# Patient Record
(demographics unavailable — no encounter records)

---

## 2024-11-06 NOTE — REVIEW OF SYSTEMS
[Fever] : no fever [Chills] : no chills [Nasal Congestion] : nasal congestion [Postnasal Drip] : postnasal drip [Cough] : no cough [Sputum] : no sputum [Chest Discomfort] : no chest discomfort [GERD] : gerd [Anxiety] : anxiety

## 2024-11-06 NOTE — PHYSICAL EXAM
[No Acute Distress] : no acute distress [Normal Appearance] : normal appearance [Normal Rate/Rhythm] : normal rate/rhythm [Normal S1, S2] : normal s1, s2 [No Resp Distress] : no resp distress [Clear to Auscultation Bilaterally] : clear to auscultation bilaterally [Normal Gait] : normal gait [No Clubbing] : no clubbing [No Edema] : no edema [Oriented x3] : oriented x3 [TextBox_11] : erythema of bilateral nasal turbinates

## 2024-11-07 NOTE — ASSESSMENT
[FreeTextEntry1] : 59-year-old female, former smoker, multinodular goiter, anxiety and bipolar 2 with seasonal reactive airway disease here for a follow up visit.  Symbicort prn. UTD with flu and pneumococcal vaccination.

## 2024-11-07 NOTE — HISTORY OF PRESENT ILLNESS
[Former] : former [TextBox_4] : 59-year-old female, former smoker, multinodular goiter, anxiety and bipolar 2 with seasonal reactive airway disease here for a follow up visit.   PFT (11/22/2023) normal FEV1, FVC, FEV1/FVC, lung volumes and DLCO.   No significant change in symptoms.  [TextBox_11] : 1 - 2  [TextBox_13] : 15 [YearQuit] : 1991

## 2024-11-19 NOTE — HISTORY OF PRESENT ILLNESS
[FreeTextEntry1] :  Pt returns for a follow-up visit for thyroid nodule/MNG. No interval health changes. No major surgeries, hospitalizations, fractures or changes in medication.  H/o MNG dx in 2005. FNA 2005 does not remember which side, results nonmalignant. +FHx nodular goiter and hypothyroidism (mother and sister). No h/o RT. Was on lithium for about 10 years but d/c in distant past. No amiodarone use. No local neck pain. No dysphonia but pt c/o dysphagia, she has not had any f/u for this yet. No raciness, shakiness, heat/cold intolerance, tiredness, or fatigue. No palpitations, tremors, or sudden weight gain/loss.  6/18/2015: 0.0 x 0.7 x 1.cm right upper lobe nodule, partially cystic small area of ca rim. Smaller 3 mm right lower lobe nodule.  Previously hospitalized at [facility] for chest pain, angiogram showed blockage, rx w/ Lipitor 10 mg.

## 2024-11-19 NOTE — END OF VISIT
[FreeTextEntry3] :  This note was written by Missy Montano on (November 18, 2024) acting as a medical scribe for Dr. Meraz This note was authored by the medical scribe for me. I have reviewed, edited, and revised the note as needed. I am in agreement with the exam findings, imaging findings, and treatment plan.  Alfred Meraz MD

## 2024-11-19 NOTE — PROCEDURE
[FreeTextEntry1] : Thyroid Ultrasound: 11/18/2024 Right lateral nodule: 6mm x 7mm x 9mm Right nodule w/ calcified rim: 9mm x 7mm x 11mm Left medial isoechoic nodule: 8mm x 8mm x 10mm   Thyroid US 10/30/2023 Left: Isoechoic 9 mm X 7 mm X 8 mm nodule Right: upper nodule 9 mm x 7 mm x 11 mm w/ some coarse rim calcification, Right lower nodule 6 mm x 5 mm x 7 mm  Thyroid US -April 25, 2023 Left: Isoechoic 8 mm X 4 mm X 8 mm nodule Right: upper nodule 9 mm x 7 mm x 11 mm w/ some coarse rim calcification, Right  lower nodule 6 mm x 5 mm x 7 mm  Thyroid US - 04/18/2022 Left: not clearly visualized Right: upper nodule 9 mm x 6 mm x 12 mm w/ some coarse rim calcification, lower nodule 6 mm x 5 mm x 7 mm  Thyroid US - 01/31/2020 Left: 7 x 7 x 11 mm nodule Right: upper nodule w/ partially calcified rim 10 x 8 x 12 mm, lower isoechoic nodule 6 x 6 x 9 mm  Thyroid US - 12/14/2018 bilat heterogeneity  Left: lower 10 x 6 x 8 mm nodule  Right: 9 x 8 x 10 mm nodule with coarse peripheral calcification, 6 x 5 x 9 mm nodule  Thyroid US - 7/11/16 Right thyroid nodule: 1.22 X 0.82 x 0.71, solid and cystic component, small peripheral calcification Left thyroid nodule: 0.32 x 0.24 x 0.17 cystic

## 2024-11-19 NOTE — ASSESSMENT
[FreeTextEntry1] : 58 y/o female returns for a follow-up visit for thyroid nodule/MNG.   Pt was first dx in 2005. FNA 2005 benign. FHx of nodules (mother and sister). Pt was on lithium in the distant past. Pt has no local neck pain or dysphagia (only with quetiapine). FNA in the past per pt report was benign. No sx of hyper or hypothyroidism. Chemically and clinically euthyroid. No local neck pain. No dysphonia but pt c/o dysphagia, she has not had any f/u for this yet. No raciness, shakiness, heat/cold intolerance, tiredness, or fatigue. No palpitations, tremors, or sudden weight gain/loss.   Thyroid ultrasound 11/2024 indicates stable nodules. Observe.   Call Dr. Willy Mahan for labs   F/u in 1 year

## 2024-11-19 NOTE — HISTORY OF PRESENT ILLNESS
[FreeTextEntry1] :  Pt returns for a follow-up visit for thyroid nodule/MNG. No interval health changes. No major surgeries, hospitalizations, fractures or changes in medication.  H/o MNG dx in 2005. FNA 2005 does not remember which side, results nonmalignant. +FHx nodular goiter and hypothyroidism (mother and sister). No h/o RT. Was on lithium for about 10 years but d/c in distant past. No amiodarone use. No local neck pain. No dysphonia but pt c/o dysphagia, she has not had any f/u for this yet. No raciness, shakiness, heat/cold intolerance, tiredness, or fatigue. No palpitations, tremors, or sudden weight gain/loss.  6/18/2015: 0.0 x 0.7 x 1.cm right upper lobe nodule, partially cystic small area of ca rim. Smaller 3 mm right lower lobe nodule.  Previously hospitalized at [facility] for chest pain, angiogram showed blockage, rx w/ Lipitor 10 mg.

## 2025-07-08 NOTE — END OF VISIT
[FreeTextEntry3] :  Total time spent caring for the patient today was 35   minutes.  This includes time spent before the visit reviewing the chart, during the visit speaking to the patient and performing an examination, personally reviewing chest imaging and labs and time spent after the visit on documentation. [Time Spent: ___ minutes] : I have spent [unfilled] minutes of time on the encounter which excludes teaching and separately reported services.

## 2025-07-08 NOTE — HISTORY OF PRESENT ILLNESS
[TextBox_4] : 59-year-old female, former smoker, multinodular goiter, anxiety and bipolar 2 with seasonal reactive airway disease, followed by Dr. Loyd.  She presents today for acute visit.   PFT (11/22/2023) normal FEV1, FVC, FEV1/FVC, lung volumes and DLCO.  Int he past was given symbicort 80/4.5 and nebulizer, post covid infection for symptomatic management, which helped.  Pt was away on vacation last week and got sick, has a cough but unable to bring up sputum, heard wheezing while carrying her beach bag and was s hort of breath, had a low grade fever on Satursday.  Mild nasal congestion and throat clearing. no longer has a fever. She spoke with the fellow on call and was given rx for Breyna, which she only used once. (she was trying not to use it). Also on flonase and azelastine.  [TextBox_11] : 1-2 [TextBox_13] : 15 [YearQuit] : 1991

## 2025-07-08 NOTE — ASSESSMENT
[FreeTextEntry1] : 59-year-old female, former smoker, multinodular goiter, anxiety and bipolar 2 with seasonal reactive airway disease, followed by Dr. Loyd. Presents for acute visit s/p URI last week while traveling, cough, fever, wheezing on/off and shortness of breath. Used Breyna 80 once, and continues nasal sprays. Unable to expectorate sputum.  On exam today lungs are clear, no wheezing, Spo2 normal.  Prior PFT was normal.  Plan: Use Breyna 80 prn  Use mucinex as needed for congestion Continue nasal sprays RVP and sputum culture today. Will call her with results